# Patient Record
Sex: MALE | Race: WHITE | NOT HISPANIC OR LATINO
[De-identification: names, ages, dates, MRNs, and addresses within clinical notes are randomized per-mention and may not be internally consistent; named-entity substitution may affect disease eponyms.]

---

## 2017-01-30 ENCOUNTER — APPOINTMENT (OUTPATIENT)
Dept: UROLOGY | Facility: CLINIC | Age: 67
End: 2017-01-30

## 2017-01-30 VITALS — TEMPERATURE: 98.2 F | SYSTOLIC BLOOD PRESSURE: 104 MMHG | DIASTOLIC BLOOD PRESSURE: 62 MMHG | HEART RATE: 69 BPM

## 2017-01-30 DIAGNOSIS — N43.3 HYDROCELE, UNSPECIFIED: ICD-10-CM

## 2017-04-03 ENCOUNTER — INPATIENT (INPATIENT)
Facility: HOSPITAL | Age: 67
LOS: 0 days | Discharge: HOME | End: 2017-04-04
Attending: HOSPITALIST | Admitting: INTERNAL MEDICINE
Payer: MEDICARE

## 2017-04-03 VITALS
OXYGEN SATURATION: 95 % | DIASTOLIC BLOOD PRESSURE: 73 MMHG | SYSTOLIC BLOOD PRESSURE: 110 MMHG | TEMPERATURE: 98 F | RESPIRATION RATE: 18 BRPM | HEART RATE: 69 BPM | HEIGHT: 75 IN | WEIGHT: 315 LBS

## 2017-04-03 LAB
ADD ON TEST-SPECIMEN IN LAB: SIGNIFICANT CHANGE UP
ADD ON TEST-SPECIMEN IN LAB: SIGNIFICANT CHANGE UP
ALBUMIN SERPL ELPH-MCNC: 3.1 G/DL — LOW (ref 3.3–5)
ALP SERPL-CCNC: 53 U/L — SIGNIFICANT CHANGE UP (ref 40–120)
ALT FLD-CCNC: 21 U/L — SIGNIFICANT CHANGE UP (ref 12–78)
ANION GAP SERPL CALC-SCNC: 11 MMOL/L — SIGNIFICANT CHANGE UP (ref 5–17)
APPEARANCE UR: CLEAR — SIGNIFICANT CHANGE UP
APTT BLD: 43 SEC — HIGH (ref 27.5–37.4)
AST SERPL-CCNC: 15 U/L — SIGNIFICANT CHANGE UP (ref 15–37)
BACTERIA # UR AUTO: (no result)
BASOPHILS # BLD AUTO: 0 K/UL — SIGNIFICANT CHANGE UP (ref 0–0.2)
BASOPHILS NFR BLD AUTO: 1 % — SIGNIFICANT CHANGE UP (ref 0–2)
BILIRUB SERPL-MCNC: 1.2 MG/DL — SIGNIFICANT CHANGE UP (ref 0.2–1.2)
BILIRUB UR-MCNC: (no result)
BUN SERPL-MCNC: 13 MG/DL — SIGNIFICANT CHANGE UP (ref 7–23)
CALCIUM SERPL-MCNC: 8.5 MG/DL — SIGNIFICANT CHANGE UP (ref 8.5–10.1)
CHLORIDE SERPL-SCNC: 103 MMOL/L — SIGNIFICANT CHANGE UP (ref 96–108)
CO2 SERPL-SCNC: 23 MMOL/L — SIGNIFICANT CHANGE UP (ref 22–31)
COLOR SPEC: YELLOW — SIGNIFICANT CHANGE UP
COMMENT - URINE: SIGNIFICANT CHANGE UP
CREAT SERPL-MCNC: 0.95 MG/DL — SIGNIFICANT CHANGE UP (ref 0.5–1.3)
DIFF PNL FLD: NEGATIVE — SIGNIFICANT CHANGE UP
EOSINOPHIL # BLD AUTO: 0.2 K/UL — SIGNIFICANT CHANGE UP (ref 0–0.5)
EOSINOPHIL NFR BLD AUTO: 4.9 % — SIGNIFICANT CHANGE UP (ref 0–6)
EPI CELLS # UR: SIGNIFICANT CHANGE UP
GLUCOSE SERPL-MCNC: 105 MG/DL — HIGH (ref 70–99)
GLUCOSE UR QL: NEGATIVE MG/DL — SIGNIFICANT CHANGE UP
HCT VFR BLD CALC: 39.6 % — SIGNIFICANT CHANGE UP (ref 39–50)
HGB BLD-MCNC: 13.3 G/DL — SIGNIFICANT CHANGE UP (ref 13–17)
HYALINE CASTS # UR AUTO: (no result) /LPF
INR BLD: 2.19 RATIO — HIGH (ref 0.88–1.16)
KETONES UR-MCNC: NEGATIVE — SIGNIFICANT CHANGE UP
LACTATE SERPL-SCNC: 1.5 MMOL/L — SIGNIFICANT CHANGE UP (ref 0.7–2)
LEUKOCYTE ESTERASE UR-ACNC: (no result)
LYMPHOCYTES # BLD AUTO: 1.3 K/UL — SIGNIFICANT CHANGE UP (ref 1–3.3)
LYMPHOCYTES # BLD AUTO: 27.1 % — SIGNIFICANT CHANGE UP (ref 13–44)
MCHC RBC-ENTMCNC: 28.6 PG — SIGNIFICANT CHANGE UP (ref 27–34)
MCHC RBC-ENTMCNC: 33.7 GM/DL — SIGNIFICANT CHANGE UP (ref 32–36)
MCV RBC AUTO: 85 FL — SIGNIFICANT CHANGE UP (ref 80–100)
MONOCYTES # BLD AUTO: 0.6 K/UL — SIGNIFICANT CHANGE UP (ref 0–0.9)
MONOCYTES NFR BLD AUTO: 13.6 % — SIGNIFICANT CHANGE UP (ref 2–14)
NEUTROPHILS # BLD AUTO: 2.5 K/UL — SIGNIFICANT CHANGE UP (ref 1.8–7.4)
NEUTROPHILS NFR BLD AUTO: 53.4 % — SIGNIFICANT CHANGE UP (ref 43–77)
NITRITE UR-MCNC: NEGATIVE — SIGNIFICANT CHANGE UP
NT-PROBNP SERPL-SCNC: 982 PG/ML — HIGH (ref 0–125)
PH UR: 6 — SIGNIFICANT CHANGE UP (ref 4.8–8)
PLATELET # BLD AUTO: 163 K/UL — SIGNIFICANT CHANGE UP (ref 150–400)
POTASSIUM SERPL-MCNC: 4.3 MMOL/L — SIGNIFICANT CHANGE UP (ref 3.5–5.3)
POTASSIUM SERPL-SCNC: 4.3 MMOL/L — SIGNIFICANT CHANGE UP (ref 3.5–5.3)
PROT SERPL-MCNC: 6.3 GM/DL — SIGNIFICANT CHANGE UP (ref 6–8.3)
PROT UR-MCNC: 15 MG/DL
PROTHROM AB SERPL-ACNC: 24 SEC — HIGH (ref 9.8–12.7)
RBC # BLD: 4.65 M/UL — SIGNIFICANT CHANGE UP (ref 4.2–5.8)
RBC # FLD: 14.2 % — SIGNIFICANT CHANGE UP (ref 10.3–14.5)
RBC CASTS # UR COMP ASSIST: SIGNIFICANT CHANGE UP /HPF (ref 0–4)
SODIUM SERPL-SCNC: 137 MMOL/L — SIGNIFICANT CHANGE UP (ref 135–145)
SP GR SPEC: 1.02 — SIGNIFICANT CHANGE UP (ref 1.01–1.02)
TROPONIN I SERPL-MCNC: <0.015 NG/ML — SIGNIFICANT CHANGE UP (ref 0.01–0.04)
UROBILINOGEN FLD QL: 1 MG/DL
WBC # BLD: 4.7 K/UL — SIGNIFICANT CHANGE UP (ref 3.8–10.5)
WBC # FLD AUTO: 4.7 K/UL — SIGNIFICANT CHANGE UP (ref 3.8–10.5)
WBC UR QL: SIGNIFICANT CHANGE UP

## 2017-04-03 PROCEDURE — 71250 CT THORAX DX C-: CPT | Mod: 26

## 2017-04-03 PROCEDURE — 93010 ELECTROCARDIOGRAM REPORT: CPT

## 2017-04-03 PROCEDURE — 99285 EMERGENCY DEPT VISIT HI MDM: CPT

## 2017-04-03 PROCEDURE — 71020: CPT | Mod: 26

## 2017-04-03 RX ORDER — ASPIRIN/CALCIUM CARB/MAGNESIUM 324 MG
0 TABLET ORAL
Qty: 0 | Refills: 0 | COMMUNITY

## 2017-04-03 RX ORDER — SODIUM CHLORIDE 9 MG/ML
3 INJECTION INTRAMUSCULAR; INTRAVENOUS; SUBCUTANEOUS ONCE
Qty: 0 | Refills: 0 | Status: COMPLETED | OUTPATIENT
Start: 2017-04-03 | End: 2017-04-03

## 2017-04-03 RX ORDER — ATORVASTATIN CALCIUM 80 MG/1
40 TABLET, FILM COATED ORAL AT BEDTIME
Qty: 0 | Refills: 0 | Status: DISCONTINUED | OUTPATIENT
Start: 2017-04-03 | End: 2017-04-04

## 2017-04-03 RX ORDER — RIVAROXABAN 15 MG-20MG
0 KIT ORAL
Qty: 0 | Refills: 0 | COMMUNITY

## 2017-04-03 RX ORDER — ACETAMINOPHEN 500 MG
650 TABLET ORAL EVERY 6 HOURS
Qty: 0 | Refills: 0 | Status: DISCONTINUED | OUTPATIENT
Start: 2017-04-03 | End: 2017-04-04

## 2017-04-03 RX ORDER — RAMIPRIL 5 MG
0 CAPSULE ORAL
Qty: 0 | Refills: 0 | COMMUNITY

## 2017-04-03 RX ORDER — RIVAROXABAN 15 MG-20MG
20 KIT ORAL DAILY
Qty: 0 | Refills: 0 | Status: DISCONTINUED | OUTPATIENT
Start: 2017-04-03 | End: 2017-04-04

## 2017-04-03 RX ORDER — CEFEPIME 1 G/1
2000 INJECTION, POWDER, FOR SOLUTION INTRAMUSCULAR; INTRAVENOUS ONCE
Qty: 0 | Refills: 0 | Status: COMPLETED | OUTPATIENT
Start: 2017-04-03 | End: 2017-04-03

## 2017-04-03 RX ORDER — METOPROLOL TARTRATE 50 MG
50 TABLET ORAL DAILY
Qty: 0 | Refills: 0 | Status: DISCONTINUED | OUTPATIENT
Start: 2017-04-03 | End: 2017-04-04

## 2017-04-03 RX ORDER — ASPIRIN/CALCIUM CARB/MAGNESIUM 324 MG
81 TABLET ORAL DAILY
Qty: 0 | Refills: 0 | Status: DISCONTINUED | OUTPATIENT
Start: 2017-04-03 | End: 2017-04-04

## 2017-04-03 RX ORDER — METOPROLOL TARTRATE 50 MG
0 TABLET ORAL
Qty: 0 | Refills: 0 | COMMUNITY

## 2017-04-03 RX ORDER — ATORVASTATIN CALCIUM 80 MG/1
0 TABLET, FILM COATED ORAL
Qty: 0 | Refills: 0 | COMMUNITY

## 2017-04-03 RX ORDER — VANCOMYCIN HCL 1 G
1000 VIAL (EA) INTRAVENOUS ONCE
Qty: 0 | Refills: 0 | Status: COMPLETED | OUTPATIENT
Start: 2017-04-03 | End: 2017-04-03

## 2017-04-03 RX ORDER — FUROSEMIDE 40 MG
40 TABLET ORAL ONCE
Qty: 0 | Refills: 0 | Status: COMPLETED | OUTPATIENT
Start: 2017-04-03 | End: 2017-04-03

## 2017-04-03 RX ADMIN — ATORVASTATIN CALCIUM 40 MILLIGRAM(S): 80 TABLET, FILM COATED ORAL at 23:42

## 2017-04-03 RX ADMIN — Medication 40 MILLIGRAM(S): at 16:06

## 2017-04-03 RX ADMIN — CEFEPIME 100 MILLIGRAM(S): 1 INJECTION, POWDER, FOR SOLUTION INTRAMUSCULAR; INTRAVENOUS at 19:44

## 2017-04-03 RX ADMIN — SODIUM CHLORIDE 3 MILLILITER(S): 9 INJECTION INTRAMUSCULAR; INTRAVENOUS; SUBCUTANEOUS at 15:47

## 2017-04-03 RX ADMIN — Medication 250 MILLIGRAM(S): at 20:15

## 2017-04-03 NOTE — ED PROVIDER NOTE - CHPI ED SYMPTOMS POS
DIFFICULTY BREATHING DIFFICULTY BREATHING/SHORTNESS OF BREATH SHORTNESS OF BREATH/DIFFICULTY BREATHING/COUGH

## 2017-04-03 NOTE — ED PROVIDER NOTE - OBJECTIVE STATEMENT
66 year old male pt presents for 3 weeks of shortness of breath, now worsening. Sx are worse with exertion. +Cough, productive of yellow sputum. Seen as outpt and worked up for bronchitis. States sx improved after abx steroid, but came back yesterday and has been worsening since. No associated CP. Denies hx of COPD; pt is a former smoker, quit 30 years. +Hx of CHF, but stopped taking meds 6 months ago. Does note worsening of LE edema.  stent xarelto CHF

## 2017-04-03 NOTE — H&P ADULT - HISTORY OF PRESENT ILLNESS
65 yo M with PMH of a-fib (on xarelto), h/o CHF, CAD s/p 2 stents, HTN, dyslipidemia, p/w cough. States cough initially started 3-4 weeks, and was productive of clear sputum and then changed to yellow sputum. Patient states he took z-pack / steroids, and did not improve. In the last couple of days, he did notice a very small amount of blood tinged sputum after coughing fit. Patient also has some difficulty breathing. +Runny nose with clear secretions. Denies wheezing, nausea, vomiting, abdominal pain, CP, sore throat, myalgias, sick contacts.    PSH: B/L hip surgery  Social hx: Tobacco - quit 40 years ago, [place] -  on weekends  Family Hx: father - asthma

## 2017-04-03 NOTE — H&P ADULT - ASSESSMENT
65 yo M with PMH of a-fib (on xarelto), h/o CHF, CAD s/p 2 stents, HTN, dyslipidemia, p/w cough    *Community acquired PNA - not improving on zpack  -Will start levaquin  -Blood cultures + Sputum cultures  -Pulse ox monitoring   -Some mild hemoptysis due to irritation from coughing -> will c/w xarelto/ASA for now. If hemoptysis returns, will consider holding    *A-fib on xarelto  -Rate controlled    *CAD s/p stents  -C/w ASA     *Dyslipidemia / HTN  -C/w Toprol + Lipitor  -Lifestyle modifications  -Ramipril is non-formulary    *DVT ppx  -on xarelto 65 yo M with PMH of a-fib (on xarelto), h/o CHF, CAD s/p 2 stents, HTN, dyslipidemia, p/w cough    *Community acquired PNA - not improving on zpack  -Will start levaquin  -Blood cultures + Sputum cultures  -Pulse ox monitoring   -Some mild hemoptysis due to irritation from coughing -> will c/w xarelto/ASA for now. If hemoptysis returns, will consider holding    *A-fib on xarelto  -Rate controlled    *CAD s/p stents  -C/w ASA     *CHF  -Stable    *Dyslipidemia / HTN  -C/w Toprol + Lipitor  -Lifestyle modifications  -Ramipril is non-formulary    *DVT ppx  -on xarelto

## 2017-04-03 NOTE — ED PROVIDER NOTE - NS ED MD SCRIBE ATTENDING SCRIBE SECTIONS
HISTORY OF PRESENT ILLNESS/PHYSICAL EXAM/DISPOSITION/REVIEW OF SYSTEMS/RESULTS/PAST MEDICAL/SURGICAL/SOCIAL HISTORY/PROGRESS NOTE

## 2017-04-03 NOTE — ED ADULT NURSE NOTE - ED STAT RN HANDOFF DETAILS
Recd care of pt at this time from RN REYNA Guillaume, pt A & O x 4, VSS, pt placed on NC vial 3LPM, pt has no complaints, bed rails up, will continue to monitor.

## 2017-04-04 ENCOUNTER — TRANSCRIPTION ENCOUNTER (OUTPATIENT)
Age: 67
End: 2017-04-04

## 2017-04-04 VITALS
RESPIRATION RATE: 16 BRPM | OXYGEN SATURATION: 95 % | TEMPERATURE: 98 F | SYSTOLIC BLOOD PRESSURE: 127 MMHG | DIASTOLIC BLOOD PRESSURE: 59 MMHG | HEART RATE: 80 BPM

## 2017-04-04 DIAGNOSIS — Z98.890 OTHER SPECIFIED POSTPROCEDURAL STATES: Chronic | ICD-10-CM

## 2017-04-04 LAB
ALBUMIN SERPL ELPH-MCNC: 3 G/DL — LOW (ref 3.3–5)
ALP SERPL-CCNC: 51 U/L — SIGNIFICANT CHANGE UP (ref 40–120)
ALT FLD-CCNC: 19 U/L — SIGNIFICANT CHANGE UP (ref 12–78)
ANION GAP SERPL CALC-SCNC: 12 MMOL/L — SIGNIFICANT CHANGE UP (ref 5–17)
AST SERPL-CCNC: 17 U/L — SIGNIFICANT CHANGE UP (ref 15–37)
BASOPHILS # BLD AUTO: 0 K/UL — SIGNIFICANT CHANGE UP (ref 0–0.2)
BASOPHILS NFR BLD AUTO: 0.9 % — SIGNIFICANT CHANGE UP (ref 0–2)
BILIRUB SERPL-MCNC: 1.4 MG/DL — HIGH (ref 0.2–1.2)
BUN SERPL-MCNC: 15 MG/DL — SIGNIFICANT CHANGE UP (ref 7–23)
CALCIUM SERPL-MCNC: 8.5 MG/DL — SIGNIFICANT CHANGE UP (ref 8.5–10.1)
CHLORIDE SERPL-SCNC: 103 MMOL/L — SIGNIFICANT CHANGE UP (ref 96–108)
CHOLEST SERPL-MCNC: 107 MG/DL — SIGNIFICANT CHANGE UP (ref 10–199)
CO2 SERPL-SCNC: 24 MMOL/L — SIGNIFICANT CHANGE UP (ref 22–31)
CREAT SERPL-MCNC: 0.95 MG/DL — SIGNIFICANT CHANGE UP (ref 0.5–1.3)
CULTURE RESULTS: NO GROWTH — SIGNIFICANT CHANGE UP
EOSINOPHIL # BLD AUTO: 0.3 K/UL — SIGNIFICANT CHANGE UP (ref 0–0.5)
EOSINOPHIL NFR BLD AUTO: 5.4 % — SIGNIFICANT CHANGE UP (ref 0–6)
GLUCOSE SERPL-MCNC: 109 MG/DL — HIGH (ref 70–99)
HCT VFR BLD CALC: 39.3 % — SIGNIFICANT CHANGE UP (ref 39–50)
HDLC SERPL-MCNC: 27 MG/DL — LOW (ref 40–125)
HGB BLD-MCNC: 13.5 G/DL — SIGNIFICANT CHANGE UP (ref 13–17)
LIPID PNL WITH DIRECT LDL SERPL: 48 MG/DL — SIGNIFICANT CHANGE UP
LYMPHOCYTES # BLD AUTO: 1.6 K/UL — SIGNIFICANT CHANGE UP (ref 1–3.3)
LYMPHOCYTES # BLD AUTO: 31 % — SIGNIFICANT CHANGE UP (ref 13–44)
MAGNESIUM SERPL-MCNC: 2 MG/DL — SIGNIFICANT CHANGE UP (ref 1.8–2.4)
MCHC RBC-ENTMCNC: 29.2 PG — SIGNIFICANT CHANGE UP (ref 27–34)
MCHC RBC-ENTMCNC: 34.2 GM/DL — SIGNIFICANT CHANGE UP (ref 32–36)
MCV RBC AUTO: 85.2 FL — SIGNIFICANT CHANGE UP (ref 80–100)
MONOCYTES # BLD AUTO: 0.6 K/UL — SIGNIFICANT CHANGE UP (ref 0–0.9)
MONOCYTES NFR BLD AUTO: 12.4 % — SIGNIFICANT CHANGE UP (ref 2–14)
NEUTROPHILS # BLD AUTO: 2.5 K/UL — SIGNIFICANT CHANGE UP (ref 1.8–7.4)
NEUTROPHILS NFR BLD AUTO: 50.3 % — SIGNIFICANT CHANGE UP (ref 43–77)
PHOSPHATE SERPL-MCNC: 3.6 MG/DL — SIGNIFICANT CHANGE UP (ref 2.5–4.5)
PLATELET # BLD AUTO: 148 K/UL — LOW (ref 150–400)
POTASSIUM SERPL-MCNC: 3.9 MMOL/L — SIGNIFICANT CHANGE UP (ref 3.5–5.3)
POTASSIUM SERPL-SCNC: 3.9 MMOL/L — SIGNIFICANT CHANGE UP (ref 3.5–5.3)
PROT SERPL-MCNC: 6.4 GM/DL — SIGNIFICANT CHANGE UP (ref 6–8.3)
RAPID RVP RESULT: SIGNIFICANT CHANGE UP
RBC # BLD: 4.62 M/UL — SIGNIFICANT CHANGE UP (ref 4.2–5.8)
RBC # FLD: 13.9 % — SIGNIFICANT CHANGE UP (ref 10.3–14.5)
SODIUM SERPL-SCNC: 139 MMOL/L — SIGNIFICANT CHANGE UP (ref 135–145)
SPECIMEN SOURCE: SIGNIFICANT CHANGE UP
TOTAL CHOLESTEROL/HDL RATIO MEASUREMENT: 4 RATIO — SIGNIFICANT CHANGE UP (ref 3.4–9.6)
TRIGL SERPL-MCNC: 159 MG/DL — HIGH (ref 10–149)
WBC # BLD: 5.1 K/UL — SIGNIFICANT CHANGE UP (ref 3.8–10.5)
WBC # FLD AUTO: 5.1 K/UL — SIGNIFICANT CHANGE UP (ref 3.8–10.5)

## 2017-04-04 RX ORDER — CIPROFLOXACIN LACTATE 400MG/40ML
1 VIAL (ML) INTRAVENOUS
Qty: 5 | Refills: 0 | OUTPATIENT
Start: 2017-04-04 | End: 2017-04-09

## 2017-04-04 RX ADMIN — RIVAROXABAN 20 MILLIGRAM(S): KIT at 10:56

## 2017-04-04 RX ADMIN — Medication 81 MILLIGRAM(S): at 10:56

## 2017-04-04 NOTE — DISCHARGE NOTE ADULT - PLAN OF CARE
improved cough, no chest pain, shortness of breath, wheezing, fevers Continue antibiotics as prescribed .  Please follow up closely with your PMD, Dr. Sykes.  Take mucinex as needed for cough.

## 2017-04-04 NOTE — PATIENT PROFILE ADULT. - VISION (WITH CORRECTIVE LENSES IF THE PATIENT USUALLY WEARS THEM):
wears glasses for distance/Partially impaired: cannot see medication labels or newsprint, but can see obstacles in path, and the surrounding layout; can count fingers at arm's length

## 2017-04-04 NOTE — DISCHARGE NOTE ADULT - CARE PLAN
Principal Discharge DX:	Community acquired pneumonia  Goal:	improved cough, no chest pain, shortness of breath, wheezing, fevers  Instructions for follow-up, activity and diet:	Continue antibiotics as prescribed .  Please follow up closely with your PMD, Dr. Sykes.  Take mucinex as needed for cough.

## 2017-04-04 NOTE — DISCHARGE NOTE ADULT - PROVIDER TOKENS
FREE:[LAST:[Dr. Charlton],PHONE:[(   )    -],FAX:[(   )    -]],FREE:[LAST:[],PHONE:[(   )    -],FAX:[(   )    -]]

## 2017-04-04 NOTE — PATIENT PROFILE ADULT. - PMH
CHF (congestive heart failure)    Chronic atrial fibrillation    HTN (hypertension)    Stented coronary artery

## 2017-04-04 NOTE — DISCHARGE NOTE ADULT - HOSPITAL COURSE
Pt admittted w/ CAP , failed zpak.  No fever, no leukocytosis, VSS, good O2 sats on room air.  Clinically stable for discharge on po levaquin and close follow up with PMD.

## 2017-04-04 NOTE — DISCHARGE NOTE ADULT - MEDICATION SUMMARY - MEDICATIONS TO TAKE
I will START or STAY ON the medications listed below when I get home from the hospital:    Medrol Dosepak 4 mg oral tablet  -- 1 packet(s) by mouth prn MDD:use as directed  -- It is very important that you take or use this exactly as directed.  Do not skip doses or discontinue unless directed by your doctor.  Obtain medical advice before taking any non-prescription drugs as some may affect the action of this medication.  Take with food or milk.    -- Indication: For PNEUMONIA, cough,     aspirin 81 mg oral delayed release capsule  --  by mouth   -- Indication: For Home med    Altace 2.5 mg oral capsule  --  by mouth   -- Indication: For Home med    Xarelto 20 mg oral tablet  --  by mouth   -- Indication: For Home med    Lipitor 40 mg oral tablet  --  by mouth   -- Indication: For Home med    Toprol-XL 50 mg oral tablet, extended release  --  by mouth   -- Indication: For Home med    levoFLOXacin 750 mg oral tablet  -- 1 tab(s) by mouth every 24 hours MDD:start tomorrow  -- Indication: For PNEUMONIA

## 2017-04-04 NOTE — DISCHARGE NOTE ADULT - PATIENT PORTAL LINK FT
“You can access the [facility] Patient Portal, offered by [facility], by registering with the following website: [email or web address]”

## 2017-04-04 NOTE — PROGRESS NOTE ADULT - SUBJECTIVE AND OBJECTIVE BOX
PMD: Dr. Renner    HPI:  65 yo M with PMH of a-fib (on xarelto), h/o CHF, CAD s/p 2 stents, HTN, dyslipidemia, p/w cough. States cough initially started 3-4 weeks, and was productive of clear sputum and then changed to yellow sputum. Patient states he took z-pack / steroids, and did not improve. In the last couple of days, he did notice a very small amount of blood tinged sputum after coughing fit. Patient also has some difficulty breathing. +Runny nose with clear secretions. Denies wheezing, nausea, vomiting, abdominal pain, CP, sore throat, myalgias, sick contacts.    4/4-     ROS-    Vital Signs Last 24 Hrs  T(C): 36.9, Max: 36.9 (04-03 @ 19:10)  T(F): 98.5, Max: 98.5 (04-04 @ 10:58)  HR: 77 (61 - 80)  BP: 110/60 (100/56 - 116/69)  BP(mean): --  RR: 16 (16 - 19)  SpO2: 93% (90% - 96%)      PHYSICAL EXAM:    General: NAD, comfortable  Neuro: AAOx3, no focal deficits  HEENT: NCAT, PERRLA, EOMI,   Neck: Soft and supple, No JVD  Respiratory: CTA b/l, no w/r/r  Cardiovascular: S1 and S2, RRR, no m/g/r  Gastrointestinal: +BS, soft, NTND, no rebound/guarding  Extremities: No c/c/e  Vascular: 2+ peripheral pulses  Musculoskeletal: 5/5 strength b/l UE and LE, sensation intact  Skin: No rashes        LABS: All Labs Reviewed:                        13.5   5.1   )-----------( 148      ( 04 Apr 2017 06:55 )             39.3     04 Apr 2017 06:55    139    |  103    |  15     ----------------------------<  109    3.9     |  24     |  0.95     Ca    8.5        04 Apr 2017 06:55  Phos  3.6       04 Apr 2017 06:55  Mg     2.0       04 Apr 2017 06:55    TPro  6.4    /  Alb  3.0    /  TBili  1.4    /  DBili  x      /  AST  17     /  ALT  19     /  AlkPhos  51     04 Apr 2017 06:55    PT/INR - ( 03 Apr 2017 15:59 )   PT: 24.0 sec;   INR: 2.19 ratio         PTT - ( 03 Apr 2017 15:59 )  PTT:43.0 sec  CARDIAC MARKERS ( 03 Apr 2017 15:05 )  <0.015 ng/mL / x     / x     / x     / x          EXAM:  CT CHEST                          PROCEDURE DATE:  04/03/2017    IMPRESSION:   Nonspecific groundglass and tree-in-bud opacities in both lungs,   predominantly on the right, likely on an infectious basis. Follow-up to   resolution after the appropriate clinical treatment is recommended.        MEDICATIONS  (STANDING):  aspirin  chewable 81milliGRAM(s) Oral daily  rivaroxaban 20milliGRAM(s) Oral daily  atorvastatin 40milliGRAM(s) Oral at bedtime  metoprolol succinate ER 50milliGRAM(s) Oral daily  levoFLOXacin  Tablet 750milliGRAM(s) Oral every 24 hours    MEDICATIONS  (PRN):  acetaminophen   Tablet 650milliGRAM(s) Oral every 6 hours PRN For Temp greater than 38 C (100.4 F)      Assessment and Plan:   		  65 yo M with PMH of a-fib (on xarelto), h/o CHF, CAD s/p 2 stents, HTN, dyslipidemia, p/w cough    *Community acquired PNA - not improving on zpack  -Blood cultures + Sputum cultures  - good O2 sats on room air  - afebrile, no leukocytosis  - levaquin  -     *A-fib   - rate controlled on BB  - continue xarelto     *CAD s/p stents  -C/w ASA , statin, BB     *HLD  - statin  -low cholesterol, low fat diet     *DVT ppx  -on xarelto PMD: Dr. Renner    HPI:  67 yo M with PMH of a-fib (on xarelto), CAD s/p 2 stents 2015, HTN, dyslipidemia, p/w cough. States cough initially started 3-4 weeks, and was productive of clear sputum and then changed to yellow sputum. Patient states he took z-pack / steroids, and did not improve. In the last couple of days, he did notice a very small amount of blood tinged sputum after coughing fit. Patient also has some difficulty breathing. +Runny nose with clear secretions. Denies wheezing, nausea, vomiting, abdominal pain, CP, sore throat, myalgias, sick contacts.    4/4- Feeling better overall.  C/o Intermittent coughing fits triggered by certain scents and smoke. Denies hx asthma, copd.  Denies cp, sob, palp.  O2 sats 98% on room air.  Eager to be discharged.      ROS- 10pt ros neg except for above.     Vital Signs Last 24 Hrs  T(C): 36.9, Max: 36.9 (04-03 @ 19:10)  T(F): 98.5, Max: 98.5 (04-04 @ 10:58)  HR: 77 (61 - 80)  BP: 110/60 (100/56 - 116/69)  BP(mean): --  RR: 16 (16 - 19)  SpO2: 93% (90% - 96%)      PHYSICAL EXAM:    General: NAD, comfortable, seated in bed, non acutely ill appearing  Neuro: AAOx3, no focal deficits  HEENT: NCAT   Neck: Soft and supple, No JVD  Respiratory: coarse sounds, mild expiratory wheeze, no rales - but nonlabored breathing and good O2 sats on room air   Cardiovascular: S1 and S2, RRR, no m/g/r  Gastrointestinal: +BS, soft, NTND  Extremities: No c/c/e  Skin: No rashes        LABS: All Labs Reviewed:                        13.5   5.1   )-----------( 148      ( 04 Apr 2017 06:55 )             39.3     04 Apr 2017 06:55    139    |  103    |  15     ----------------------------<  109    3.9     |  24     |  0.95     Ca    8.5        04 Apr 2017 06:55  Phos  3.6       04 Apr 2017 06:55  Mg     2.0       04 Apr 2017 06:55    TPro  6.4    /  Alb  3.0    /  TBili  1.4    /  DBili  x      /  AST  17     /  ALT  19     /  AlkPhos  51     04 Apr 2017 06:55    PT/INR - ( 03 Apr 2017 15:59 )   PT: 24.0 sec;   INR: 2.19 ratio         PTT - ( 03 Apr 2017 15:59 )  PTT:43.0 sec  CARDIAC MARKERS ( 03 Apr 2017 15:05 )  <0.015 ng/mL / x     / x     / x     / x          EXAM:  CT CHEST                          PROCEDURE DATE:  04/03/2017    IMPRESSION:   Nonspecific groundglass and tree-in-bud opacities in both lungs,   predominantly on the right, likely on an infectious basis. Follow-up to   resolution after the appropriate clinical treatment is recommended.        MEDICATIONS  (STANDING):  aspirin  chewable 81milliGRAM(s) Oral daily  rivaroxaban 20milliGRAM(s) Oral daily  atorvastatin 40milliGRAM(s) Oral at bedtime  metoprolol succinate ER 50milliGRAM(s) Oral daily  levoFLOXacin  Tablet 750milliGRAM(s) Oral every 24 hours    MEDICATIONS  (PRN):  acetaminophen   Tablet 650milliGRAM(s) Oral every 6 hours PRN For Temp greater than 38 C (100.4 F)      Assessment and Plan:   		  67 yo M with PMH of a-fib (on xarelto), h/o CHF, CAD s/p 2 stents, HTN, dyslipidemia, p/w cough    *Community acquired PNA suspect atypical, GN organisms  - not improving  after zpak x 2  - good O2 sats on room air  - afebrile, no leukocytosis  - levaquin x 5 days and medrol dose hailey   - discussed importance of close follow up with PMD   -     *A-fib   - rate controlled on BB  - continue xarelto     *CAD s/p stents  - stable   -C/w ASA , statin, BB   - follows up w/ Cardio regularly - last seen by Dr. Tripathi 2weeks ago- pt reports normal echo    *HLD  - statin  -low cholesterol, low fat diet     *DVT ppx  -on xarelto      Time to d/c >40min.

## 2017-04-08 LAB
CULTURE RESULTS: SIGNIFICANT CHANGE UP
CULTURE RESULTS: SIGNIFICANT CHANGE UP
SPECIMEN SOURCE: SIGNIFICANT CHANGE UP
SPECIMEN SOURCE: SIGNIFICANT CHANGE UP

## 2017-04-09 LAB
CULTURE RESULTS: SIGNIFICANT CHANGE UP
SPECIMEN SOURCE: SIGNIFICANT CHANGE UP

## 2017-04-11 DIAGNOSIS — I25.10 ATHEROSCLEROTIC HEART DISEASE OF NATIVE CORONARY ARTERY WITHOUT ANGINA PECTORIS: ICD-10-CM

## 2017-04-11 DIAGNOSIS — I48.2 CHRONIC ATRIAL FIBRILLATION: ICD-10-CM

## 2017-04-11 DIAGNOSIS — E78.5 HYPERLIPIDEMIA, UNSPECIFIED: ICD-10-CM

## 2017-04-11 DIAGNOSIS — Z95.5 PRESENCE OF CORONARY ANGIOPLASTY IMPLANT AND GRAFT: ICD-10-CM

## 2017-04-11 DIAGNOSIS — I11.0 HYPERTENSIVE HEART DISEASE WITH HEART FAILURE: ICD-10-CM

## 2017-04-11 DIAGNOSIS — I50.9 HEART FAILURE, UNSPECIFIED: ICD-10-CM

## 2017-04-11 DIAGNOSIS — J15.6 PNEUMONIA DUE TO OTHER GRAM-NEGATIVE BACTERIA: ICD-10-CM

## 2017-04-11 DIAGNOSIS — R05 COUGH: ICD-10-CM

## 2017-04-11 DIAGNOSIS — Z87.891 PERSONAL HISTORY OF NICOTINE DEPENDENCE: ICD-10-CM

## 2017-10-31 ENCOUNTER — APPOINTMENT (OUTPATIENT)
Dept: UROLOGY | Facility: CLINIC | Age: 67
End: 2017-10-31
Payer: MEDICARE

## 2017-10-31 VITALS
DIASTOLIC BLOOD PRESSURE: 69 MMHG | HEART RATE: 65 BPM | SYSTOLIC BLOOD PRESSURE: 135 MMHG | TEMPERATURE: 98 F | OXYGEN SATURATION: 95 % | WEIGHT: 310 LBS | BODY MASS INDEX: 38.54 KG/M2 | HEIGHT: 75 IN

## 2017-10-31 PROBLEM — I48.2 CHRONIC ATRIAL FIBRILLATION: Chronic | Status: ACTIVE | Noted: 2017-04-03

## 2017-10-31 PROBLEM — Z95.5 PRESENCE OF CORONARY ANGIOPLASTY IMPLANT AND GRAFT: Chronic | Status: ACTIVE | Noted: 2017-04-03

## 2017-10-31 PROCEDURE — 99213 OFFICE O/P EST LOW 20 MIN: CPT

## 2017-10-31 RX ORDER — METOPROLOL SUCCINATE 50 MG/1
50 TABLET, EXTENDED RELEASE ORAL
Qty: 180 | Refills: 0 | Status: ACTIVE | COMMUNITY
Start: 2017-09-27

## 2017-10-31 RX ORDER — RAMIPRIL 2.5 MG/1
2.5 CAPSULE ORAL
Qty: 90 | Refills: 0 | Status: ACTIVE | COMMUNITY
Start: 2017-09-13

## 2017-10-31 RX ORDER — ATORVASTATIN CALCIUM 40 MG/1
40 TABLET, FILM COATED ORAL
Qty: 90 | Refills: 0 | Status: ACTIVE | COMMUNITY
Start: 2017-09-25

## 2017-10-31 RX ORDER — FUROSEMIDE 20 MG/1
20 TABLET ORAL
Qty: 90 | Refills: 0 | Status: ACTIVE | COMMUNITY
Start: 2017-10-30

## 2017-10-31 RX ORDER — RIVAROXABAN 20 MG/1
20 TABLET, FILM COATED ORAL
Qty: 30 | Refills: 0 | Status: ACTIVE | COMMUNITY
Start: 2017-10-17

## 2017-11-01 LAB
APPEARANCE: ABNORMAL
BACTERIA: NEGATIVE
BILIRUB UR QL STRIP: NEGATIVE
BILIRUBIN URINE: NEGATIVE
BLOOD URINE: ABNORMAL
COLLECTION METHOD: NORMAL
COLOR: ABNORMAL
GLUCOSE QUALITATIVE U: NEGATIVE MG/DL
GLUCOSE UR-MCNC: NEGATIVE
HCG UR QL: 0.2 EU/DL
HGB UR QL STRIP.AUTO: NORMAL
HYALINE CASTS: 3 /LPF
KETONES UR-MCNC: NEGATIVE
KETONES URINE: NEGATIVE
LEUKOCYTE ESTERASE UR QL STRIP: NEGATIVE
LEUKOCYTE ESTERASE URINE: NEGATIVE
MICROSCOPIC-UA: NORMAL
NITRITE UR QL STRIP: NEGATIVE
NITRITE URINE: NEGATIVE
PH UR STRIP: 5.5
PH URINE: 5
PROT UR STRIP-MCNC: NEGATIVE
PROTEIN URINE: ABNORMAL MG/DL
RED BLOOD CELLS URINE: >720 /HPF
SP GR UR STRIP: 1.02
SPECIFIC GRAVITY URINE: 1.01
SQUAMOUS EPITHELIAL CELLS: 0 /HPF
UROBILINOGEN URINE: NEGATIVE MG/DL
WHITE BLOOD CELLS URINE: 7 /HPF

## 2017-11-02 LAB — BACTERIA UR CULT: NORMAL

## 2017-11-10 PROBLEM — R31.0 GROSS HEMATURIA: Status: ACTIVE | Noted: 2017-10-31

## 2017-11-10 RX ORDER — CIPROFLOXACIN HYDROCHLORIDE 500 MG/1
500 TABLET, FILM COATED ORAL
Qty: 28 | Refills: 0 | Status: ACTIVE | COMMUNITY
Start: 2017-11-10 | End: 1900-01-01

## 2017-11-13 ENCOUNTER — APPOINTMENT (OUTPATIENT)
Dept: UROLOGY | Facility: CLINIC | Age: 67
End: 2017-11-13
Payer: MEDICARE

## 2017-11-13 VITALS
DIASTOLIC BLOOD PRESSURE: 67 MMHG | HEART RATE: 66 BPM | WEIGHT: 310 LBS | OXYGEN SATURATION: 99 % | TEMPERATURE: 98.7 F | BODY MASS INDEX: 38.54 KG/M2 | SYSTOLIC BLOOD PRESSURE: 126 MMHG | RESPIRATION RATE: 16 BRPM | HEIGHT: 75 IN

## 2017-11-13 DIAGNOSIS — N41.9 INFLAMMATORY DISEASE OF PROSTATE, UNSPECIFIED: ICD-10-CM

## 2017-11-13 DIAGNOSIS — Z87.442 PERSONAL HISTORY OF URINARY CALCULI: ICD-10-CM

## 2017-11-13 DIAGNOSIS — R31.0 GROSS HEMATURIA: ICD-10-CM

## 2017-11-13 PROCEDURE — 52214Z: CUSTOM

## 2017-11-13 RX ORDER — FINASTERIDE 5 MG/1
5 TABLET, FILM COATED ORAL
Qty: 90 | Refills: 3 | Status: ACTIVE | COMMUNITY
Start: 2017-11-13 | End: 1900-01-01
